# Patient Record
Sex: MALE | Race: WHITE | NOT HISPANIC OR LATINO
[De-identification: names, ages, dates, MRNs, and addresses within clinical notes are randomized per-mention and may not be internally consistent; named-entity substitution may affect disease eponyms.]

---

## 2019-02-15 PROBLEM — Z00.00 ENCOUNTER FOR PREVENTIVE HEALTH EXAMINATION: Status: ACTIVE | Noted: 2019-02-15

## 2019-03-01 ENCOUNTER — APPOINTMENT (OUTPATIENT)
Dept: NEUROSURGERY | Facility: CLINIC | Age: 44
End: 2019-03-01
Payer: COMMERCIAL

## 2019-03-01 VITALS
HEART RATE: 72 BPM | SYSTOLIC BLOOD PRESSURE: 121 MMHG | DIASTOLIC BLOOD PRESSURE: 79 MMHG | RESPIRATION RATE: 16 BRPM | HEIGHT: 71 IN | WEIGHT: 160 LBS | TEMPERATURE: 99.1 F | OXYGEN SATURATION: 96 % | BODY MASS INDEX: 22.4 KG/M2

## 2019-03-01 DIAGNOSIS — Z78.9 OTHER SPECIFIED HEALTH STATUS: ICD-10-CM

## 2019-03-01 DIAGNOSIS — G47.30 SLEEP APNEA, UNSPECIFIED: ICD-10-CM

## 2019-03-01 DIAGNOSIS — I67.1 CEREBRAL ANEURYSM, NONRUPTURED: ICD-10-CM

## 2019-03-01 DIAGNOSIS — F32.9 MAJOR DEPRESSIVE DISORDER, SINGLE EPISODE, UNSPECIFIED: ICD-10-CM

## 2019-03-01 DIAGNOSIS — F41.9 ANXIETY DISORDER, UNSPECIFIED: ICD-10-CM

## 2019-03-01 PROCEDURE — 99205 OFFICE O/P NEW HI 60 MIN: CPT

## 2019-03-01 RX ORDER — CLONAZEPAM 2 MG/1
TABLET ORAL
Refills: 0 | Status: ACTIVE | COMMUNITY

## 2019-03-01 NOTE — DATA REVIEWED
[de-identified] : 	\par Exam requested by:\par RAÚL HUMPHREYS MD\par [address]\[place] [address]\par SITE PERFORMED: [facility] [place]\par Patient: JEANNE MURILLO\par YOB: 1975\par Phone: [phone]\par MRN: [id number] Acc: [id number]\par Date of Exam: 01-\par EXAM:  MRI BRAIN WITHOUT CONTRAST AND MR ANGIOGRAPHY BRAIN WITHOUT CONTRAST\par \par HISTORY:  Migraine headaches, patient also reports dizziness\par \par TECHNIQUE:  \par \par 1) MRI examination of the brain was performed utilizing axial T1 weighted , axial diffusion, axial SWI, and axial T2 weighted images. The examination is supplemented with high resolution 3-D FIESTA axial images through the posterior fossa/internal auditory canals.. Volumetric sagittal FLAIR imaging of the brain was also performed and reformatted in the axial, sagittal, and coronal planes.\par \par 2) MR angiogram of the intracranial arterial circulation was performed. The examination consists of 3D time of flight MR angiography at the level of the [place] of [place]. Multi-oblique maximum intensity projection images were reviewed as well as the source images. \par \par COMPARISON:  No prior MRI/MRA study is available for comparison.\par \par FINDINGS: \par \par The ventricular, sulcal and cisternal spaces are normal in appearance. There is no intracranial mass effect, hydrocephalus, midline shift or extra-axial fluid collection.  \par \par There is no acute infarction. There is no cerebral white matter signal abnormality. No parenchymal hemorrhage nor hemosiderin deposition is observed.\par \par There is no lesion observed in the cerebellopontine angle cisterns. The internal auditory canals are symmetric and normal in appearance. There is no signal abnormality involving the brainstem or cerebellar hemispheres.  The cerebellar tonsils are normal in position.\par \par The major intracranial arterial flow voids at the skull base are preserved.\par \par The orbital structures soft tissues are unremarkable.\par \par The cavernous sinuses and Meckel's caves are symmetric and normal in appearance. The central skull base is intact. The pituitary gland is not enlarged. Small 7 mm focus of T1/T2 hypointensity within the right nasopharynx is nonspecific but may represent desiccated mucus retention cyst or calcification.\par \par Mild scattered mucosal thickening noted in the maxillary and ethmoid sinuses. No paranasal sinus fluid levels are identified.\par \par \par The internal carotid arteries are normal in caliber and contour. The anterior, middle and posterior cerebral arteries are patent. There appears to be a minute approximately 2 mm vascular outpouching at the left MCA bifurcation which is suspicious for a small aneurysm (axial source images 147 and 148, MIP rotational images #10 through 12 of series 351). The vertebral arteries and basilar artery are patent.\par \par \par There is no evidence of arteriovenous vascular malformation or intracranial vascular stenosis.  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 Suggest continued MR angiographic follow-up.\par \par Unremarkable noncontrast MRI examination of the brain.\par Thank you for the opportunity to participate in the care of this patient.  \par  \par Saqib Rowe Jr, MD [phone]\par Electronically Signed: 01- 9:41 AM\par Exam requested by:RAÚL HUMPHREYS MD\par

## 2019-03-01 NOTE — REVIEW OF SYSTEMS
[As Noted in HPI] : as noted in HPI [Memory Lapses or Loss] : memory loss [Decr. Concentrating Ability] : decreased concentrating ability [Dizziness] : dizziness [Lightheadedness] : lightheadedness [Cluster Headache] : cluster headaches [Negative] : Heme/Lymph

## 2019-03-01 NOTE — HISTORY OF PRESENT ILLNESS
[de-identified] : 43 year old man with past medical history anxiety, depression, sleep apnea referred by Dr. Shaheen Coyne for MRA findings of 2 mm LEFT MCA bifurcation aneurysm. The patient reports he developed dizziness and feelings of weakness in January. He went to his PCP and was referred to Dr. Coyne. MRI and MRA brain was done, which demonstrated a minute 2 mm LEFT MCA bifurcation aneurysm. He reports that dizziness and weakness have since resolved. \par \par He is a non-smoker and has no smoking history. Denies family history of cerebral aneurysms. \par \par Presently, he denies headaches, dizziness, focal weakness, changes in vision/speech.

## 2019-03-01 NOTE — PHYSICAL EXAM
[General Appearance - Alert] : alert [General Appearance - In No Acute Distress] : in no acute distress [Oriented To Time, Place, And Person] : oriented to person, place, and time [Impaired Insight] : insight and judgment were intact [Motor Tone] : muscle tone was normal in all four extremities [Motor Strength] : muscle strength was normal in all four extremities [Sclera] : the sclera and conjunctiva were normal [Outer Ear] : the ears and nose were normal in appearance [Neck Appearance] : the appearance of the neck was normal [] : no respiratory distress [Respiration, Rhythm And Depth] : normal respiratory rhythm and effort [Heart Rate And Rhythm] : heart rate was normal and rhythm regular [Abnormal Walk] : normal gait [Skin Color & Pigmentation] : normal skin color and pigmentation

## 2019-03-01 NOTE — PLAN
[FreeTextEntry1] : Dr. Talavera reviewed MRA from 1/28/19 with patient.\par Treatment options presented in detail, including surgery, endovascular intervention, and imaging surveillance. \par Due to small size and location of aneurysm, favor annual surveillance with MRA brain.\par Multiple questions answered to patient's satisfaction.\par \par Plan:\par \par 1. MRA brain without contrast in one year (January 2019)\par 2. Return to the office after imaging complete\par \par Agreement and understanding verbalized.

## 2019-03-01 NOTE — CONSULT LETTER
[Dear  ___] : Dear  [unfilled], [Consult Letter:] : I had the pleasure of evaluating your patient, [unfilled]. [Please see my note below.] : Please see my note below. [Consult Closing:] : Thank you very much for allowing me to participate in the care of this patient.  If you have any questions, please do not hesitate to contact me. [Sincerely,] : Sincerely, [FreeTextEntry2] : Dr. Shaheen Coyne\par [address]\[place] [address] [FreeTextEntry3] : Darwin Talavera MD

## 2022-02-01 ENCOUNTER — APPOINTMENT (OUTPATIENT)
Dept: MRI IMAGING | Facility: HOSPITAL | Age: 47
End: 2022-02-01

## 2022-02-01 ENCOUNTER — OUTPATIENT (OUTPATIENT)
Dept: OUTPATIENT SERVICES | Facility: HOSPITAL | Age: 47
LOS: 1 days | End: 2022-02-01
Payer: COMMERCIAL

## 2022-02-01 PROCEDURE — 70544 MR ANGIOGRAPHY HEAD W/O DYE: CPT | Mod: 26

## 2022-02-01 PROCEDURE — 70544 MR ANGIOGRAPHY HEAD W/O DYE: CPT

## 2022-02-04 ENCOUNTER — NON-APPOINTMENT (OUTPATIENT)
Age: 47
End: 2022-02-04

## 2022-02-04 ENCOUNTER — APPOINTMENT (OUTPATIENT)
Dept: NEUROSURGERY | Facility: CLINIC | Age: 47
End: 2022-02-04
Payer: COMMERCIAL

## 2022-02-04 VITALS
BODY MASS INDEX: 23.1 KG/M2 | SYSTOLIC BLOOD PRESSURE: 119 MMHG | HEART RATE: 68 BPM | DIASTOLIC BLOOD PRESSURE: 73 MMHG | TEMPERATURE: 98.3 F | OXYGEN SATURATION: 98 % | WEIGHT: 165 LBS | RESPIRATION RATE: 12 BRPM | HEIGHT: 71 IN

## 2022-02-04 PROCEDURE — 99214 OFFICE O/P EST MOD 30 MIN: CPT

## 2022-02-05 NOTE — ASSESSMENT
[FreeTextEntry1] : MRA brain  from 2/1/22 reviewed by Dr. Talavera with the patient. MRA brain stable when compared to previous imaging from 2019. Due to small size and location of aneurysm, favor annual surveillance with MRA brain.\par Risks factors of aneurysm---smoking, HTN and FHx of cerebra, aneurysm were explained to the patient.\par He was instructed to go to the ED for new onset severe headaches.\par \par Plan:\par MRA brain x 1 yr and RTO to review it. He will call the office to schedule.\par \par \par \par \par I, Dr. Talavera, personally performed the evaluation and management (E/M) services for this established patient who presents today with (a) new problem(s)/exacerbation of (an) existing condition(s). That E/M includes conducting the examination, assessing all new/exacerbated conditions, and establishing a new plan of care. Today, my ACP, Agustina Rm, was here to observe my evaluation and management services for this new problem/exacerbated condition to be followed going forward.\par \par \par \par \par

## 2022-02-05 NOTE — HISTORY OF PRESENT ILLNESS
[FreeTextEntry1] : 46 year old man with past medical history anxiety, depression, sleep apnea referred by Dr. Shaheen Coyne for MRA findings of 2 mm LEFT MCA bifurcation aneurysm. The patient  developed dizziness and feelings of weakness in January. He went to his PCP and was referred to Dr. Coyne. MRI and MRA brain was done, which demonstrated a minute 2 mm LEFT MCA bifurcation \par \par He is a non-smoker and has no smoking history. Denies family history of cerebral aneurysms. \par Denies headaches.\par \par

## 2022-02-05 NOTE — DATA REVIEWED
[de-identified] : [facility]\par    [facility] Department of Radiology\par   Radiology Report\par \par \par Patient Name: LIDIA CAMACHO  Report Date: 01-Feb-2022 12:54.00 \par Patient ID: [id number] ([id number]), [id number] ([id number])  Accession No.: [id number] \par Patient Birth Date: 1975  Report Status: F \par Referring Physician: [id number] KATY QUEEN   Reason For Study: Follow up stability of cerebral aneurysm;  \par \par \par \par \par ACC: [id number] EXAM: MR ANGIO BRAIN\par \par PROCEDURE DATE: 02/01/2022\par \par \par \par INTERPRETATION: PROCEDURE: MRA brain without contrast.\par \par INDICATION: Cerebral aneurysm; follow-up\par \par TECHNIQUE: The MRA of the brain was performed utilizing 3 D TOF technique. Axial FLAIR and diffusion weighted images of the brain were also obtained.\par \par COMPARISON: None available\par \par FINDINGS: The MRA examination demonstrates the internal carotid arteries to be normal in caliber. There is a normal bifurcation into the A1 and M1 segments. There is a normal right MCA bifurcation. There is a outpouching involving the left MCA bifurcation compatible with a aneurysm. This measures approximately 3 mm width x 2 mm AP (series 4 1 image 159). The vertebral arteries are normal in caliber. The basilar artery is normal in caliber. There is a normal bifurcation into the posterior cerebral arteries.\par \par Evaluation of the brain demonstrates the ventricles, cisternal spaces, and cortical sulci to be appropriate for the patient's stated age. There is no midline shift or extra-axial collection. No abnormal signal is identified. The diffusion-weighted images demonstrate no acute ischemia.\par \par IMPRESSION: Approximately 3 x 2 mm aneurysm involving the left MCA bifurcation.\par \par --- End of Report ---\par \par \par \par \par \par MARGO VELAZQUEZ MD; Attending Radiologist\par This document has been electronically signed. Feb 1 2022 12:54PM \par

## 2022-02-05 NOTE — ADDENDUM
[FreeTextEntry1] : Patient with 2 mm left MCA aneurysm found incidentally. Aneurysm stable compared to MRA 2 years ago. Natural history and treatment options for cerebral aneurysms discussed. Patient understands there is a small but non-zero risk of aneurysmal subarachnoid hemorrhage. Plan for followup MRA annually. 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

## 2022-02-05 NOTE — PHYSICAL EXAM
[General Appearance - Alert] : alert [General Appearance - In No Acute Distress] : in no acute distress [Oriented To Time, Place, And Person] : oriented to person, place, and time [Impaired Insight] : insight and judgment were intact [Motor Tone] : muscle tone was normal in all four extremities [Motor Strength] : muscle strength was normal in all four extremities [Neck Appearance] : the appearance of the neck was normal [] : no respiratory distress [Respiration, Rhythm And Depth] : normal respiratory rhythm and effort [Abnormal Walk] : normal gait

## 2022-02-05 NOTE — REASON FOR VISIT
[Follow-Up: _____] : a [unfilled] follow-up visit [FreeTextEntry1] : to review MRA brain for hx 2mm left MCA bifurcation aneurysm

## 2022-06-13 ENCOUNTER — NON-APPOINTMENT (OUTPATIENT)
Age: 47
End: 2022-06-13